# Patient Record
Sex: FEMALE | Race: BLACK OR AFRICAN AMERICAN | NOT HISPANIC OR LATINO | URBAN - NONMETROPOLITAN AREA
[De-identification: names, ages, dates, MRNs, and addresses within clinical notes are randomized per-mention and may not be internally consistent; named-entity substitution may affect disease eponyms.]

---

## 2017-09-01 ENCOUNTER — APPOINTMENT (OUTPATIENT)
Dept: LAB | Facility: HOSPITAL | Age: 52
End: 2017-09-01

## 2017-09-01 ENCOUNTER — OFFICE VISIT (OUTPATIENT)
Dept: PAIN MEDICINE | Facility: CLINIC | Age: 52
End: 2017-09-01

## 2017-09-01 VITALS
HEIGHT: 70 IN | DIASTOLIC BLOOD PRESSURE: 82 MMHG | SYSTOLIC BLOOD PRESSURE: 122 MMHG | BODY MASS INDEX: 22.92 KG/M2 | WEIGHT: 160.1 LBS

## 2017-09-01 DIAGNOSIS — G89.29 CHRONIC LOW BACK PAIN, UNSPECIFIED BACK PAIN LATERALITY, WITH SCIATICA PRESENCE UNSPECIFIED: ICD-10-CM

## 2017-09-01 DIAGNOSIS — M70.62 GREATER TROCHANTERIC BURSITIS, LEFT: ICD-10-CM

## 2017-09-01 DIAGNOSIS — M79.18 MYOFACIAL MUSCLE PAIN: ICD-10-CM

## 2017-09-01 DIAGNOSIS — Z79.899 HIGH RISK MEDICATIONS (NOT ANTICOAGULANTS) LONG-TERM USE: ICD-10-CM

## 2017-09-01 DIAGNOSIS — M47.817 LUMBOSACRAL SPONDYLOSIS WITHOUT MYELOPATHY: ICD-10-CM

## 2017-09-01 DIAGNOSIS — M54.5 CHRONIC LOW BACK PAIN, UNSPECIFIED BACK PAIN LATERALITY, WITH SCIATICA PRESENCE UNSPECIFIED: ICD-10-CM

## 2017-09-01 DIAGNOSIS — M51.36 DDD (DEGENERATIVE DISC DISEASE), LUMBAR: Primary | ICD-10-CM

## 2017-09-01 PROCEDURE — 99214 OFFICE O/P EST MOD 30 MIN: CPT | Performed by: PAIN MEDICINE

## 2017-09-01 PROCEDURE — 80307 DRUG TEST PRSMV CHEM ANLYZR: CPT | Performed by: NURSE PRACTITIONER

## 2017-09-01 PROCEDURE — G0481 DRUG TEST DEF 8-14 CLASSES: HCPCS | Performed by: NURSE PRACTITIONER

## 2017-09-01 RX ORDER — MELATONIN
1000 DAILY
COMMUNITY

## 2017-09-01 NOTE — PROGRESS NOTES
"Loren Sepulveda is a 52 y.o. female.   1965    HPI:   Location: lower back, bilateral hip and bilateral leg  Quality: aching  Severity: 9/10  Timing: constant  Alleviating: pain medication and heating pad  Aggravating: lying down    Pt return visit from Sept 2016. Pt with chronic lower back pain with reported scoliosis and left lateral leg pain. Pt was sent to pain management and was controlled with opiate medications and lumbar injections- reports that LESI \"helped for 5 months\". I have reviewed Dr. Parry notes and procedures.  Pt reports \"lost her job\" and \"lost her transportation- even PACS was not helpful\". Pt is using Daughter's car today.     Date of Service:    09/19/2016      ANG-EPIDURAL BLK INJ-LUMBAR/SACRAL:  This procedure was performed by Dr. Mulugeta Parry.  Please refer to  patient's medical record for information regarding this procedure this  date.    The following portions of the patient's history were reviewed by me and updated as appropriate: allergies, current medications, past family history, past medical history, past social history, past surgical history and problem list.    Past Medical History:   Diagnosis Date   • Degeneration of lumbar intervertebral disc    • Headache    • Hypertensive disorder    • Lumbosacral radiculitis    • Osteoarthritis    • Osteoporosis        Social History     Social History   • Marital status:      Spouse name: N/A   • Number of children: N/A   • Years of education: N/A     Occupational History   • Not on file.     Social History Main Topics   • Smoking status: Current Every Day Smoker     Types: Cigarettes   • Smokeless tobacco: Former User   • Alcohol use No   • Drug use: No   • Sexual activity: Defer     Other Topics Concern   • Not on file     Social History Narrative       Family History   Problem Relation Age of Onset   • Alcohol abuse Other    • Asthma Other    • Bleeding Disorder Other    • Cancer Other    • Diabetes Other    • Heart disease Other "    • Hypertension Other    • Migraines Other    • Osteoporosis Other    • Sickle cell anemia Other    • Sudden death Other    • Thyroid disease Other    • Tuberculosis Other          Current Outpatient Prescriptions:   •  amLODIPine (NORVASC) 10 MG tablet, Take 10 mg by mouth daily., Disp: , Rfl:   •  cholecalciferol (VITAMIN D3) 1000 units tablet, Take 1,000 Units by mouth Daily., Disp: , Rfl:   •  diphenhydrAMINE (BENADRYL) 50 MG/ML injection, Infuse  into a venous catheter., Disp: , Rfl:   •  FLUTICASONE PROPIONATE, NASAL, NA, into each nostril., Disp: , Rfl:   •  Polyethylene Glycol 3350 (MIRALAX PO), Take  by mouth., Disp: , Rfl:   •  POTASSIUM CHLORIDE PO, Take  by mouth., Disp: , Rfl:   •  tiZANidine (ZANAFLEX) 4 MG tablet, Take  by mouth., Disp: , Rfl:     Allergies   Allergen Reactions   • Ibuprofen        Review of Systems   Musculoskeletal: Positive for back pain (lower).        Bilateral hip pain  Bilateral leg pain   All other systems reviewed and are negative.    All systems reviewed and negative except for above.    Physical Exam   Constitutional: She appears well-developed and well-nourished. No distress.   HENT:   Head: Normocephalic.   Cardiovascular: Normal rate.    No murmur heard.  Pulmonary/Chest: Effort normal. No respiratory distress.   Musculoskeletal:        Left hip: She exhibits tenderness ( greater trochanter tenderness ).        Lumbar back: She exhibits decreased range of motion (flex 45 deg and 15 deg ext with abdirahman lumbar loading. ) and tenderness ( midline TTP ).   Neurological: She is alert. She displays no tremor. She displays no seizure activity. Coordination and gait normal.   Reflex Scores:       Tricep reflexes are 2+ on the right side and 2+ on the left side.       Bicep reflexes are 2+ on the right side and 2+ on the left side.       Brachioradialis reflexes are 2+ on the right side and 2+ on the left side.       Patellar reflexes are 2+ on the right side and 2+ on the left  side.       Achilles reflexes are 1+ on the right side and 1+ on the left side.  Mild left SLR    abdirahman lower ext strength 5/5    Reports left lateral leg pain down to foot   Skin: Skin is warm and dry. No rash noted.   Psychiatric: She has a normal mood and affect. Her behavior is normal. Judgment normal. She expresses no homicidal and no suicidal ideation. She expresses no suicidal plans and no homicidal plans.   Nursing note and vitals reviewed.      Loren was seen today for back pain, hip pain and leg pain.    Diagnoses and all orders for this visit:    DDD (degenerative disc disease), lumbar  -     IR epidural block injection lumbar spine; Future    Chronic low back pain, unspecified back pain laterality, with sciatica presence unspecified  -     IR epidural block injection lumbar spine; Future    Lumbosacral spondylosis without myelopathy    Myofacial muscle pain    High risk medications (not anticoagulants) long-term use  -     ToxASSURE Select 13 (MW)    Greater trochanteric bursitis, left        Medication: None at this time Pt is currently taking Zanaflex and motrin. I have explained that opiates will not be apart of today's visit. It is possible that non-opiate interventions will be tried first.     Interventional: I have discussed and ordered Lumbar Epidural Steroid Injection related to chronic lower back pain and left left involvement.   I have discussed the risks and benefits of the procedure with the patient.  Pt may be a candidate for repeat LESI- effective for 5months 80%. Pt possible left GTB injection in future. JULISA and any neurological impairments discussed with patient that may need of emergency evaluation.      Rehab: Pt with in past with minimal success.     Behavioral: No aberrant behavior noted. [facility] Report # [id number]  was reviewed and is consistent with stated history    Urine drug screen Ordered today to test for drugs of abuse and prescribed medications          This document has been  electronically signed by JOSE Cartagena on September 1, 2017 9:05 AM          This document has been electronically signed by JOSE Cartagena on September 1, 2017 9:05 AM

## 2017-09-11 LAB — CONV REPORT SUMMARY: NORMAL

## 2017-09-14 ENCOUNTER — HOSPITAL ENCOUNTER (OUTPATIENT)
Dept: INTERVENTIONAL RADIOLOGY/VASCULAR | Facility: HOSPITAL | Age: 52
Discharge: HOME | End: 2017-09-14